# Patient Record
Sex: MALE | Race: WHITE | NOT HISPANIC OR LATINO | URBAN - METROPOLITAN AREA
[De-identification: names, ages, dates, MRNs, and addresses within clinical notes are randomized per-mention and may not be internally consistent; named-entity substitution may affect disease eponyms.]

---

## 2021-09-27 ENCOUNTER — OFFICE VISIT - RIVER FALLS (OUTPATIENT)
Dept: FAMILY MEDICINE | Facility: CLINIC | Age: 36
End: 2021-09-27

## 2021-09-27 ASSESSMENT — MIFFLIN-ST. JEOR: SCORE: 1787.22

## 2022-02-11 VITALS
TEMPERATURE: 97.8 F | DIASTOLIC BLOOD PRESSURE: 62 MMHG | HEIGHT: 67 IN | WEIGHT: 197 LBS | BODY MASS INDEX: 30.92 KG/M2 | SYSTOLIC BLOOD PRESSURE: 118 MMHG | HEART RATE: 78 BPM

## 2022-02-16 NOTE — NURSING NOTE
Comprehensive Intake Entered On:  9/27/2021 9:25 AM CDT    Performed On:  9/27/2021 9:19 AM CDT by Liseth Rivas LPN               Summary   Chief Complaint :   pre-employment [employer]   Weight Measured :   197 lb(Converted to: 197 lb 0 oz, 89.358 kg)    Height Measured :   67 in(Converted to: 5 ft 7 in, 170.18 cm)    Body Mass Index :   30.85 kg/m2 (HI)    Body Surface Area :   2.05 m2   Systolic Blood Pressure :   118 mmHg   Diastolic Blood Pressure :   62 mmHg   Mean Arterial Pressure :   81 mmHg   Peripheral Pulse Rate :   78 bpm   BP Site :   Right arm   Pulse Site :   Radial artery   BP Method :   Manual   HR Method :   Manual   Temperature Tympanic :   97.8 DegF(Converted to: 36.6 DegC)  (LOW)    Liseth Rivas LPN - 9/27/2021 9:19 AM CDT   Health Status   Allergies Verified? :   Yes   Medication History Verified? :   Yes   Pre-Visit Planning Status :   Completed   Tobacco Use? :   Former smoker   Liseth Rivas LPN - 9/27/2021 9:19 AM CDT   Consents   Consent for Immunization Exchange :   Consent Granted   Consent for Immunizations to Providers :   Consent Granted   Liseth Rivas LPN - 9/27/2021 9:19 AM CDT   Meds / Allergies   (As Of: 9/27/2021 9:25:29 AM CDT)   Allergies (Active)   No known allergies  Estimated Onset Date:   Unspecified ; Created By:   Sofya Rios LPN; Reaction Status:   Active ; Category:   Drug ; Substance:   No known allergies ; Type:   Allergy ; Updated By:   Sofya Rios LPN; Reviewed Date:   9/27/2021 9:25 AM CDT        Medication List   (As Of: 9/27/2021 9:25:29 AM CDT)   Prescription/Discharge Order    triamcinolone topical  :   triamcinolone topical ; Status:   Prescribed ; Ordered As Mnemonic:   triamcinolone 0.1% topical cream ; Simple Display Line:   1 cara, TOP, bid, 30 g ; Ordering Provider:   Will Rice MD; Catalog Code:   triamcinolone topical ; Order Dt/Tm:   6/10/2014 10:16:30 AM CDT            Home Meds    fexofenadine  :    fexofenadine ; Status:   Documented ; Ordered As Mnemonic:   Allegra ; Simple Display Line:   po, daily, PRN: as needed for allergy symptoms ; Catalog Code:   fexofenadine ; Order Dt/Tm:   6/10/2014 10:06:35 AM CDT            Social History   Social History   (As Of: 9/27/2021 9:25:29 AM CDT)   Alcohol:  Current      Current, 1-2 times per week, 6 drinks/episode average.   (Last Updated: 11/21/2011 9:52:29 AM CST by Cyrus Hooper PA-C)          Tobacco:  Current      Current, Snuff   (Last Updated: 11/21/2011 9:52:41 AM CST by Cyrus Hooper PA-C)   Former smoker, quit more than 30 days ago   (Last Updated: 9/27/2021 9:23:12 AM CDT by Liseth Rivas LPN)          Electronic Cigarette/Vaping:        Electronic Cigarette Use: Never.   (Last Updated: 9/27/2021 9:23:16 AM CDT by Liseth Rivas LPN)          Substance Abuse:  Denies Substance Abuse      (Last Updated: 11/22/2011 5:41:11 PM CST by Lashonda De La Torre )         Employment/School:        Employed, Work/School description: .   (Last Updated: 11/21/2011 9:52:56 AM CST by Cyrus Hooper PA-C)          Home/Environment:        Marital status: Single.  Lives with Roomate(s)/Friend(s).   (Last Updated: 11/21/2011 9:53:15 AM CST by Cyrus Hooper PA-C)          Exercise:  Does not exercise      (Last Updated: 11/22/2011 5:41:15 PM CST by Lashonda De La Torre )
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
